# Patient Record
Sex: FEMALE | Race: BLACK OR AFRICAN AMERICAN | Employment: PART TIME | URBAN - METROPOLITAN AREA
[De-identification: names, ages, dates, MRNs, and addresses within clinical notes are randomized per-mention and may not be internally consistent; named-entity substitution may affect disease eponyms.]

---

## 2021-03-31 ENCOUNTER — OFFICE VISIT (OUTPATIENT)
Dept: FAMILY MEDICINE CLINIC | Age: 21
End: 2021-03-31
Payer: COMMERCIAL

## 2021-03-31 VITALS
HEART RATE: 70 BPM | TEMPERATURE: 98.7 F | BODY MASS INDEX: 22.78 KG/M2 | HEIGHT: 66 IN | SYSTOLIC BLOOD PRESSURE: 110 MMHG | DIASTOLIC BLOOD PRESSURE: 60 MMHG

## 2021-03-31 DIAGNOSIS — Z20.2 EXPOSURE TO SEXUALLY TRANSMITTED DISEASE (STD): Primary | ICD-10-CM

## 2021-03-31 PROCEDURE — 99213 OFFICE O/P EST LOW 20 MIN: CPT | Performed by: PHYSICIAN ASSISTANT

## 2021-03-31 PROCEDURE — 96372 THER/PROPH/DIAG INJ SC/IM: CPT | Performed by: PHYSICIAN ASSISTANT

## 2021-03-31 RX ORDER — CEFTRIAXONE 500 MG/1
500 INJECTION, POWDER, FOR SOLUTION INTRAMUSCULAR; INTRAVENOUS EVERY 24 HOURS
Status: SHIPPED | OUTPATIENT
Start: 2021-03-31

## 2021-03-31 RX ORDER — AZITHROMYCIN 500 MG/1
1000 TABLET, FILM COATED ORAL DAILY
Qty: 2 TAB | Refills: 0 | Status: SHIPPED | OUTPATIENT
Start: 2021-03-31 | End: 2021-04-01

## 2021-03-31 RX ADMIN — CEFTRIAXONE 500 MG: 500 INJECTION, POWDER, FOR SOLUTION INTRAMUSCULAR; INTRAVENOUS at 10:03

## 2021-03-31 NOTE — PROGRESS NOTES
Paul Navarrete is a 24 y.o. female who presents to the office today with the following:  Chief Complaint   Patient presents with    Exposure to STD     Gerlfriend told her she had Gonorrhea/Chlamydia       HPI  Pt told recently her girlfriend tested positive for gonorrhea/chlamydia. Pt has not had any symptoms. Has only had oral sex with her girlfriend. They are in a monogamous relationship. Denies other partners in last 6 mo. Current partner being tx also. Pt denies ST or mouth lesions, no vaginal discharge or  sxs, denies fever/chills/body aches or other systemic sxs. Otherwise reports feeling well. Does ask about some hair growth on her chin and chest, minimal, just plucks them. Was wondering if she needed labs for this. Has no other sxs. Has regular monthly menstrual cycles. No pelvic pain/sxs. No wt issues. ROS  See HPI. Past Medical History:   Diagnosis Date    Pneumonia        History reviewed. No pertinent surgical history. No Known Allergies    Current Outpatient Medications   Medication Sig    azithromycin (ZITHROMAX) 500 mg tab Take 2 Tabs by mouth daily for 1 dose.      Current Facility-Administered Medications   Medication    cefTRIAXone (ROCEPHIN) injection 500 mg       Social History     Socioeconomic History    Marital status: SINGLE     Spouse name: Not on file    Number of children: Not on file    Years of education: Not on file    Highest education level: Not on file   Tobacco Use    Smoking status: Never Smoker    Smokeless tobacco: Never Used   Substance and Sexual Activity    Alcohol use: No    Drug use: No    Sexual activity: Yes     Partners: Female   Other Topics Concern    Blood Transfusions No    Sleep Concern No    Stress Concern No    Seat Belt Yes   Social History Narrative    Going in to 11th grade, do not smoke z       Family History   Problem Relation Age of Onset    Diabetes Maternal Grandfather     Rashes/Skin Problems Sister Physical Exam:  Visit Vitals  /60 (BP 1 Location: Left upper arm, BP Patient Position: At rest, BP Cuff Size: Adult)   Pulse 70   Temp 98.7 °F (37.1 °C) (Oral)   Ht 5' 5.5\" (1.664 m)   LMP 03/04/2021 (Approximate)   BMI 22.78 kg/m²     Physical Exam  Vitals signs and nursing note reviewed. Constitutional:       Appearance: Normal appearance. Comments: Thin AAF   HENT:      Head: Normocephalic and atraumatic. Right Ear: External ear normal.      Left Ear: External ear normal.      Nose: Nose normal.      Mouth/Throat:      Mouth: Mucous membranes are moist.      Pharynx: Oropharynx is clear. Eyes:      Conjunctiva/sclera: Conjunctivae normal.   Neck:      Musculoskeletal: Neck supple. Cardiovascular:      Rate and Rhythm: Normal rate and regular rhythm. Pulses: Normal pulses. Heart sounds: Normal heart sounds. Pulmonary:      Effort: Pulmonary effort is normal.      Breath sounds: Normal breath sounds. Abdominal:      Palpations: Abdomen is soft. Tenderness: There is no guarding. Lymphadenopathy:      Cervical: No cervical adenopathy. Skin:     General: Skin is warm and dry. Comments: No unusual hair growth seen (pt \"plucks them\" she says)   Neurological:      General: No focal deficit present. Mental Status: She is alert and oriented to person, place, and time. Psychiatric:         Mood and Affect: Mood normal.         Assessment/Plan:    ICD-10-CM ICD-9-CM    1. Exposure to sexually transmitted disease (STD)  Z20.2 V01.6 CHLAMYDIA/GC PCR      cefTRIAXone (ROCEPHIN) injection 500 mg      azithromycin (ZITHROMAX) 500 mg tab       pt tolerated injection w/o difficulty. She was told to abstain from sexual activity until both she and her partner are treated. May be a good candidate for repeat testing in 3 mo, d/w pt. She declines other STD testing at this time.   Reassured with normal periods hair growth unlikely a concern with underlying hormonal issues (d/w Dr. Marcie Swanson who agrees), but if develops addnl sxs or still concerned I recommended f/u with GYN. Pt agrees to rtc/seek further evaluation should any symptoms or addnl concerns arise. Pt verbalizes understanding and agrees with the plan.     Marisa Pavon PA-C

## 2021-04-02 LAB
C TRACH DNA SPEC QL NAA+PROBE: NEGATIVE
N GONORRHOEA DNA SPEC QL NAA+PROBE: NEGATIVE
SAMPLE TYPE: NORMAL
SERVICE CMNT-IMP: NORMAL
SPECIMEN SOURCE: NORMAL

## 2023-05-16 RX ORDER — CEFTRIAXONE 500 MG/1
500 INJECTION, POWDER, FOR SOLUTION INTRAMUSCULAR; INTRAVENOUS EVERY 24 HOURS
COMMUNITY
Start: 2021-03-31